# Patient Record
Sex: FEMALE | Race: WHITE
[De-identification: names, ages, dates, MRNs, and addresses within clinical notes are randomized per-mention and may not be internally consistent; named-entity substitution may affect disease eponyms.]

---

## 2017-06-02 ENCOUNTER — HOSPITAL ENCOUNTER (OUTPATIENT)
Dept: HOSPITAL 76 - LAB.N | Age: 42
Discharge: HOME | End: 2017-06-02
Attending: FAMILY MEDICINE
Payer: MEDICAID

## 2017-06-02 DIAGNOSIS — G89.4: ICD-10-CM

## 2017-06-02 DIAGNOSIS — Z79.899: ICD-10-CM

## 2017-06-02 DIAGNOSIS — F41.1: Primary | ICD-10-CM

## 2017-06-02 LAB
ALBUMIN/GLOB SERPL: 1.4 {RATIO} (ref 1–2.2)
ANION GAP SERPL CALCULATED.4IONS-SCNC: 7 MMOL/L (ref 6–13)
BASOPHILS NFR BLD AUTO: 0.1 10^3/UL (ref 0–0.1)
BASOPHILS NFR BLD AUTO: 0.7 %
BILIRUB BLD-MCNC: 0.4 MG/DL (ref 0.2–1)
BUN SERPL-MCNC: 14 MG/DL (ref 6–20)
CALCIUM UR-MCNC: 9.1 MG/DL (ref 8.5–10.3)
CHLORIDE SERPL-SCNC: 106 MMOL/L (ref 101–111)
CHOLEST SERPL-MCNC: 199 MG/DL
CO2 SERPL-SCNC: 26 MMOL/L (ref 21–32)
CREAT SERPLBLD-SCNC: 0.8 MG/DL (ref 0.4–1)
EOSINOPHIL # BLD AUTO: 0.4 10^3/UL (ref 0–0.7)
EOSINOPHIL NFR BLD AUTO: 5.1 %
ERYTHROCYTE [DISTWIDTH] IN BLOOD BY AUTOMATED COUNT: 14.2 % (ref 12–15)
GFRSERPLBLD MDRD-ARVRAT: 78 ML/MIN/{1.73_M2} (ref 89–?)
GLOBULIN SER-MCNC: 3 G/DL (ref 2.1–4.2)
GLUCOSE SERPL-MCNC: 106 MG/DL (ref 70–100)
HCT VFR BLD AUTO: 38.8 % (ref 37–47)
HDLC SERPL-MCNC: 56 MG/DL
HDLC SERPL: 3.6 {RATIO} (ref ?–4.4)
HGB UR QL STRIP: 12.9 G/DL (ref 12–16)
LDLC/HDLC SERPL: 2.2 {RATIO} (ref ?–4.4)
LYMPHOCYTES # SPEC AUTO: 2 10^3/UL (ref 1.5–3.5)
LYMPHOCYTES NFR BLD AUTO: 24.2 %
MCH RBC QN AUTO: 28.4 PG (ref 27–31)
MCHC RBC AUTO-ENTMCNC: 33.3 G/DL (ref 32–36)
MCV RBC AUTO: 85.3 FL (ref 81–99)
MONOCYTES # BLD AUTO: 0.5 10^3/UL (ref 0–1)
MONOCYTES NFR BLD AUTO: 6.3 %
NEUTROPHILS # BLD AUTO: 5.3 10^3/UL (ref 1.5–6.6)
NEUTROPHILS # SNV AUTO: 8.3 X10^3/UL (ref 4.8–10.8)
NEUTROPHILS NFR BLD AUTO: 63.7 %
NRBC # BLD AUTO: 0 /100WBC
PDW BLD AUTO: 9 FL (ref 7.9–10.8)
POTASSIUM SERPL-SCNC: 3.9 MMOL/L (ref 3.5–5)
PROT SPEC-MCNC: 7.1 G/DL (ref 6.7–8.2)
RBC MAR: 4.55 10^6/UL (ref 4.2–5.4)
SODIUM SERPLBLD-SCNC: 139 MMOL/L (ref 135–145)
TRIGL P FAST SERPL-MCNC: 105 MG/DL
VLDLC SERPL-SCNC: 21 MG/DL
WBC # BLD: 8.3 X10^3/UL

## 2017-06-02 PROCEDURE — 80061 LIPID PANEL: CPT

## 2017-06-02 PROCEDURE — 85025 COMPLETE CBC W/AUTO DIFF WBC: CPT

## 2017-06-02 PROCEDURE — 36415 COLL VENOUS BLD VENIPUNCTURE: CPT

## 2017-06-02 PROCEDURE — 84443 ASSAY THYROID STIM HORMONE: CPT

## 2017-06-02 PROCEDURE — 80053 COMPREHEN METABOLIC PANEL: CPT

## 2017-07-11 ENCOUNTER — HOSPITAL ENCOUNTER (EMERGENCY)
Dept: HOSPITAL 76 - ED | Age: 42
Discharge: HOME | End: 2017-07-11
Payer: MEDICAID

## 2017-07-11 VITALS — SYSTOLIC BLOOD PRESSURE: 145 MMHG | DIASTOLIC BLOOD PRESSURE: 88 MMHG

## 2017-07-11 DIAGNOSIS — J45.909: ICD-10-CM

## 2017-07-11 DIAGNOSIS — M16.11: ICD-10-CM

## 2017-07-11 DIAGNOSIS — R03.0: ICD-10-CM

## 2017-07-11 DIAGNOSIS — F41.9: Primary | ICD-10-CM

## 2017-07-11 LAB
CUL URINE ADD CHARGE: (no result)
HCG UR QL: NEGATIVE
PH UR STRIP.AUTO: 6 PH (ref 5–7.5)
SP GR UR STRIP.AUTO: 1.01 (ref 1–1.03)
UA CHARGE (STRIP ONLY): (no result)
UA W/ MICROSCOPIC CHARGE: YES
UR CULTURE IF IND: (no result)
UROBILINOGEN UR STRIP.AUTO-MCNC: NEGATIVE MG/DL
WBC # UR MANUAL: (no result) /HPF (ref 0–5)

## 2017-07-11 PROCEDURE — 99283 EMERGENCY DEPT VISIT LOW MDM: CPT

## 2017-07-11 PROCEDURE — 80306 DRUG TEST PRSMV INSTRMNT: CPT

## 2017-07-11 PROCEDURE — 81025 URINE PREGNANCY TEST: CPT

## 2017-07-11 PROCEDURE — 81001 URINALYSIS AUTO W/SCOPE: CPT

## 2017-07-11 PROCEDURE — 87086 URINE CULTURE/COLONY COUNT: CPT

## 2017-07-11 PROCEDURE — 81003 URINALYSIS AUTO W/O SCOPE: CPT

## 2017-07-11 NOTE — ED PHYSICIAN DOCUMENTATION
History of Present Illness





- Stated complaint


Stated Complaint: RT HIP PX/ANXIETY





- Chief complaint


Chief Complaint: Ext Problem





- History obtained from


History obtained from: Patient





- History of Present Illness


Timing: How many weeks ago (3)


Pain level max: 9


Pain level now: 9


Improved by: rest


Worsened by: movement





- Additonal information


Additional information: 





States R hip pain chronically, worse for past 3 weeks.  States worse with 

movement. Takes tizanidine and oxycodone daily. Is on 40mg of oxycodone daily.  

No recent injury. No falls. Patient is on a pain management contract.  States 

that steroids have helped in the past





Review of Systems


Constitutional: denies: Fever, Chills


Respiratory: denies: Cough


GI: denies: Abdominal Pain, Nausea, Vomiting


: denies: Now pregnant EGA


Skin: denies: Rash


Musculoskeletal: denies: Neck pain, Back pain


Neurologic: denies: Headache





PD PAST MEDICAL HISTORY





- Past Medical History


Past Medical History: Yes


Cardiovascular: None


Respiratory: Asthma


Neuro: Headache/migraine


Endocrine/Autoimmune: None


Psych: Anxiety


Musculoskeletal: Osteoarthritis





- Past Surgical History


Past Surgical History: Yes


/GYN:  section





- Present Medications


Home Medications: 


 Ambulatory Orders











 Medication  Instructions  Recorded  Confirmed


 


Albuterol Sulfate [Albuterol 2 puffs INH Q6HR 14





Sulfate Hfa]   


 


Fluticasone/Salmeterol [Advair 1 puffs INH DAILY 14





500-50 Diskus]   


 


Hydroxyzine HCl 25 mg PO DAILY 14


 


oxyCODONE [Roxicodone] 5 mg PO DAILY 14


 


Fluticasone [Flonase] 1 sprays BRENNAN BID #1 bottle 05/03/15 07/11/17


 


Butalb/Acetaminophen/Caffeine 1 tab PO PRN PRN 17





[Czcsox-Gswmuiew-Zxot -40]   


 


LORazepam [Ativan] 0.5 mg PO Q8H PRN #7 tablet 17 


 


Prednisone 40 mg PO DAILY #10 tablet 17 














- Allergies


Allergies/Adverse Reactions: 


 Allergies











Allergy/AdvReac Type Severity Reaction Status Date / Time


 


baclofen Allergy  Respiratory Verified 17 20:43


 


NSAIDS (Non-Steroidal Allergy  Edema Verified 17 20:43





Anti-Inflamma     


 


ipratropium AdvReac  Unknown Verified 17 20:24














- Social History


Does the pt smoke?: No


Smoking Status: Never smoker


Does the pt drink ETOH?: No


Does the pt have substance abuse?: No





- Immunizations


Immunizations are current?: No





- POLST


Patient has POLST: No





PD ED PE NORMAL





- Vitals


Vital signs reviewed: Yes





- General


General: Alert and oriented X 3, No acute distress





- Neck


Neck: Supple, no meningeal sign





- Respiratory


Respiratory: No respiratory distress, Clear bilaterally





- Abdomen


Abdomen: Soft, Non tender, Non distended





- Back


Back: No spinal TTP





- Derm


Derm: Warm and dry





- Extremities


Extremities: Other (Mild tenderness about the right hip.  There is full range 

of motion present, but with some pain.  No overlying skin changes.  No evidence 

of septic joint.  Neurovascularly intact)





- Neuro


Neuro: Alert and oriented X 3





- Psych


Psych: Normal mood, Normal affect





Results





- Vitals


Vitals: 


 Vital Signs - 24 hr











  17





  20:21 21:35


 


Temperature 37.2 C 


 


Heart Rate 102 H 90


 


Respiratory 14 16





Rate  


 


Blood Pressure 168/98 H 145/88 H


 


O2 Saturation 100 99








 Oxygen











O2 Source                      Room air

















- Labs


Labs: 


 Laboratory Tests











  17





  20:10 20:10


 


Urine Color   YELLOW


 


Urine Clarity   HAZY


 


Urine pH   6.0


 


Ur Specific Gravity   1.010


 


Urine Protein   NEGATIVE


 


Urine Glucose (UA)   NEGATIVE


 


Urine Ketones   NEGATIVE


 


Urine Occult Blood   SMALL H


 


Urine Nitrite   NEGATIVE


 


Urine Bilirubin   NEGATIVE


 


Urine Urobilinogen   0.2 (NORMAL)


 


Ur Leukocyte Esterase   NEGATIVE


 


Urine RBC   6-10 H


 


Urine WBC   6-10 H


 


Ur Squamous Epith Cells   NONE SEEN


 


Urine Bacteria   Rare


 


Ur Microscopic Review   INDICATED


 


Urine Culture Comments   INDICATED


 


Urine HCG, Qual   NEGATIVE


 


Urine Opiates Screen  NEGATIVE 


 


Ur Oxycodone Screen  POSITIVE H 


 


Urine Methadone Screen  NEGATIVE 


 


Ur Propoxyphene Screen  NEGATIVE 


 


Ur Barbiturates Screen  POSITIVE H 


 


Ur Tricyclics Screen  NEGATIVE 


 


Ur Phencyclidine Scrn  NEGATIVE 


 


Ur Amphetamine Screen  NEGATIVE 


 


U Methamphetamines Scrn  NEGATIVE 


 


U Benzodiazepines Scrn  NEGATIVE 


 


Urine Cocaine Screen  NEGATIVE 


 


U Cannabinoids Screen  NEGATIVE 














PD MEDICAL DECISION MAKING





- ED course


Complexity details: reviewed results, re-evaluated patient, considered 

differential, d/w patient


ED course: 





Patient is a 41-year-old female who presents to the emergency department with 

worsening right hip pain.  She does use a cane to ambulate at home, but is not 

currently using this in the emergency department.  Steroids have helped in the 

past and so was given a dose of dexamethasone will prescribe prednisone for 

home.  She also states she has increasing anxiety and is out of her anxiety 

medication.  Will prescribe a small amount of benzodiazepines for her.  She is 

on the pain management contract and does not want any narcotics at this time.  

Patient is also driving home.  She was given crutches to help take the weight 

off of the leg, but is using them more as assistive walking devices.  Patient 

is well-appearing, nontoxic.  Afebrile.  Patient counseled regarding signs and 

symptoms for which I believe and urgent re-evaluation would be necessary. 

Patient with good understanding of and agreement to plan and is comfortable 

going home at this time





This document was made in part using voice recognition software. While efforts 

are made to proofread this document, sound alike and grammatical errors may 

occur.





Departure





- Departure


Disposition:  Home, Self Care


Clinical Impression: 


 Anxiety





Osteoarthritis


Qualifiers:


 Osteoarthritis location: hip Osteoarthritis type: unspecified Laterality: 

right Qualified Code(s): M16.11 - Unilateral primary osteoarthritis, right hip


Condition: Good


Instructions:  ED Degenerative Joint Disease


Follow-Up: 


Alfonso Rodriges MD [Primary Care Provider] - Within 1 week


Prescriptions: 


LORazepam [Ativan] 0.5 mg PO Q8H PRN #7 tablet


 PRN Reason: Anxiety


Prednisone 40 mg PO DAILY #10 tablet


Comments: 


Return if you worsen. do not drive or operate heavy machinery while taking 

ativan. 





Your blood pressure was elevated today on check in to the emergency department. 

This does not mean that you have hypertension, it is a common phenomenon to 

check into the emergency department and have elevated blood pressure. I 

recommend that you see your primary care physician within the week to have it 

rechecked when you're feeling better.





Discharge Date/Time: 17 21:41

## 2017-12-01 ENCOUNTER — HOSPITAL ENCOUNTER (EMERGENCY)
Dept: HOSPITAL 76 - ED | Age: 42
Discharge: HOME | End: 2017-12-01
Payer: MEDICAID

## 2017-12-01 VITALS — SYSTOLIC BLOOD PRESSURE: 150 MMHG | DIASTOLIC BLOOD PRESSURE: 99 MMHG

## 2017-12-01 DIAGNOSIS — H66.002: ICD-10-CM

## 2017-12-01 DIAGNOSIS — J40: Primary | ICD-10-CM

## 2017-12-01 PROCEDURE — 71020: CPT

## 2017-12-01 PROCEDURE — 99283 EMERGENCY DEPT VISIT LOW MDM: CPT

## 2017-12-01 PROCEDURE — 94640 AIRWAY INHALATION TREATMENT: CPT

## 2017-12-01 NOTE — ED PHYSICIAN DOCUMENTATION
PD HPI URI





- Stated complaint


Stated Complaint: CONGESTION/ST





- Chief complaint


Chief Complaint: Resp





- History obtained from


History obtained from: Patient





- History of Present Illness


Timing - onset: How many days ago (10)


Timing duration: Days (10)


Timing details: Gradual onset, Still present (was improving some after a week, 

now worse the past few days.)


Associated symptoms: Fever, Sinus pain, Sore throat, Productive cough


Contributing factors: COPD / asthma.  No: Sick contact, Travel, 

Immunocompromised


Worsened by: Activity, Breathing, Other (cough)


Similar symptoms before: Has not had sx before


Recently seen: Not recently seen





Review of Systems


Constitutional: reports: Fever, Chills, Myalgias


Nose: reports: Rhinorrhea / runny nose, Sinus pressure / pain


Cardiac: denies: Chest pain / pressure


Respiratory: reports: Dyspnea, Cough


GI: reports: Vomiting.  denies: Nausea, Diarrhea


: denies: Dysuria





PD PAST MEDICAL HISTORY





- Past Medical History


Past Medical History: Yes


Cardiovascular: None


Respiratory: Asthma


Neuro: Headache/migraine


Endocrine/Autoimmune: None


GI: None


GYN: None


: None


HEENT: None


Psych: Anxiety


Musculoskeletal: Osteoarthritis


Derm: None





- Past Surgical History


Past Surgical History: Yes


/GYN:  section





- Present Medications


Home Medications: 


 Ambulatory Orders











 Medication  Instructions  Recorded  Confirmed


 


Albuterol Sulfate [Albuterol 2 puffs INH Q6HR 14





Sulfate Hfa]   


 


Fluticasone/Salmeterol [Advair 1 puffs INH DAILY 14





500-50 Diskus]   


 


Hydroxyzine HCl 25 mg PO DAILY 14


 


oxyCODONE [Roxicodone] 5 mg PO DAILY 14


 


Fluticasone [Flonase] 1 sprays BRENNAN BID #1 bottle 05/03/15 07/11/17


 


Butalb/Acetaminophen/Caffeine 1 tab PO PRN PRN 17





[Agjadb-Exdhztab-Qbuc -40]   


 


LORazepam [Ativan] 0.5 mg PO Q8H PRN #7 tablet 17 


 


predniSONE [Prednisone] 40 mg PO DAILY #10 tablet 17 


 


Amoxicillin 500 mg PO Q8H #30 capsule 17 


 


Oxymetazoline HCl [Afrin] 2 spray NS BID PRN #1 bottle 17 


 


predniSONE [Deltasone] 60 mg PO DAILY 5 Days  tablet 17 


 


Albuterol Sulf [Ventolin Hfa 1 - 2 puffs INH Q4HR PRN #1 inhaler 17 





Inhaler]   


 


Azithromycin [Zithromax] 250 mg PO DAILY #4 tablet 17 


 


Benzonatate [Tessalon] 100 mg PO TID PRN #25 capsule 17 


 


Dexamethasone [Decadron] 4 mg PO DAILY #5 tablet 17 


 


guaiFENesin/CODEINE [Robitussin AC] 10 ml PO Q6H PRN #240 ml 17 














- Allergies


Allergies/Adverse Reactions: 


 Allergies











Allergy/AdvReac Type Severity Reaction Status Date / Time


 


baclofen Allergy  Respiratory Verified 17 19:10


 


NSAIDS (Non-Steroidal Allergy  Edema Verified 17 19:10





Anti-Inflamma     


 


ipratropium AdvReac  Unknown Verified 17 19:10














- Social History


Does the pt smoke?: No


Smoking Status: Never smoker


Does the pt drink ETOH?: No


Does the pt have substance abuse?: No





- Immunizations


Immunizations are current?: No





- POLST


Patient has POLST: No





PD ED PE NORMAL





- Vitals


Vital signs reviewed: Yes





- General


General: Alert and oriented X 3, Well developed/nourished





- HEENT


HEENT: Ears normal, Pharynx benign





- Neck


Neck: Supple, no meningeal sign, No adenopathy





- Cardiac


Cardiac: RRR, No murmur





- Respiratory


Respiratory: Clear bilaterally





- Abdomen


Abdomen: Soft, Non tender





- Back


Back: No CVA TTP





- Derm


Derm: Normal color, Warm and dry





- Extremities


Extremities: No deformity, No tenderness to palpate, No edema, No calf 

tenderness / cord





- Neuro


Neuro: Alert and oriented X 3, No motor deficit, Normal speech





Results





- Vitals


Vitals: 


 Oxygen











O2 Source                      Room air

















- Rads (name of study)


  ** chest xray


Radiology: Prelim report reviewed (no acute process)





PD MEDICAL DECISION MAKING





- ED course


Complexity details: considered differential, d/w patient





Departure





- Departure


Disposition:  Home, Self Care


Clinical Impression: 


 Bronchitis





Otitis media


Qualifiers:


 Otitis media type: suppurative Chronicity: acute Laterality: left Recurrence: 

not specified as recurrent Spontaneous tympanic membrane rupture: without 

spontaneous rupture Qualified Code(s): H66.002 - Acute suppurative otitis media 

without spontaneous rupture of ear drum, left ear





Clinical Impression: 


 (Ruled Out): Pneumonia


Condition: Stable


Record reviewed to determine appropriate education?: Yes


Instructions:  ED Upper Resp Infec Abx Tx, ED Otitis Media Acute Adult


Follow-Up: 


Angelica Goncalves ARNP [Primary Care Provider] - 


Prescriptions: 


Albuterol Sulf [Ventolin Hfa Inhaler] 1 - 2 puffs INH Q4HR PRN #1 inhaler


 PRN Reason: Shortness Of Air/Wheezing


Azithromycin [Zithromax] 250 mg PO DAILY #4 tablet


Benzonatate [Tessalon] 100 mg PO TID PRN #25 capsule


 PRN Reason: Cough


Dexamethasone [Decadron] 4 mg PO DAILY #5 tablet


guaiFENesin/CODEINE [Robitussin AC] 10 ml PO Q6H PRN #240 ml


 PRN Reason: Cough


Comments: 


Drink lots of fluids.  Continue albuterol inhaler 2 puffs 4 times a day and 

extra doses as needed.  Use Decadron steroid daily for 5 more days.  Finish the 

Zithromax course of 5 days treatment.  Use Tessalon if needed for cough.  Add 

hydrocodone or codeine as a cough suppressant and to help with pain.  The 

Zithromax should help with bronchial and ear infections.  Recheck if not 

improving over the next few days.


Discharge Date/Time: 17 22:17

## 2017-12-01 NOTE — XRAY REPORT
EXAM:

CHEST RADIOGRAPHY

 

EXAM DATE: 12/1/2017 07:42 PM.

 

CLINICAL HISTORY: Cough.

 

COMPARISON: 11/06/2017, 05/23/2011, and 10/26/2010.

 

TECHNIQUE: 2 views.

 

FINDINGS: 

Lungs/Pleura: No localized infiltrate, consolidation, effusion, or pneumothorax.

 

Mediastinum: Heart and mediastinal contours are unremarkable.

 

Other: Mild degenerative changes.

 

IMPRESSION: No acute disease.

 

[facility]

Referring Provider Line: [phone]

 

SITE ID: [id number]

## 2017-12-01 NOTE — XRAY PRELIMINARY REPORT
Accession: [id number]

Exam: XR CHEST 2 VIEW PA/LAT

 

IMPRESSION: No acute disease.

 

[facility]

 

SITE ID: [id number]

## 2018-08-06 ENCOUNTER — HOSPITAL ENCOUNTER (EMERGENCY)
Dept: HOSPITAL 76 - ED | Age: 43
LOS: 1 days | Discharge: HOME | End: 2018-08-07
Payer: MEDICAID

## 2018-08-06 DIAGNOSIS — S83.92XA: Primary | ICD-10-CM
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
## 2018-08-06 PROCEDURE — 73562 X-RAY EXAM OF KNEE 3: CPT

## 2018-08-06 PROCEDURE — 93971 EXTREMITY STUDY: CPT

## 2018-08-06 PROCEDURE — 99283 EMERGENCY DEPT VISIT LOW MDM: CPT

## 2018-08-06 NOTE — ED PHYSICIAN DOCUMENTATION
History of Present Illness





- Stated complaint


Stated Complaint: L KNEE/LEG PX





- Chief complaint


Chief Complaint: Ext Problem





- History obtained from


History obtained from: Patient





- History of Present Illness


Timing: How many weeks ago (1)


Pain level max: 8


Pain level now: 6


Improved by: rest


Worsened by: movement, weight-bearing





- Additonal information


Additional information: 





c/o 1 week of left knee pain and swelling, sudden onset when she sustained a 

twisting injury to the knee





Review of Systems


Constitutional: denies: Fever


Cardiac: denies: Chest pain / pressure


Respiratory: denies: Dyspnea


Skin: denies: Rash


Musculoskeletal: reports: Joint pain, Extremity swelling, Joint swelling, Pain 

with weight bearing


Neurologic: denies: Focal weakness, Numbness





PD PAST MEDICAL HISTORY





- Past Medical History


Cardiovascular: None


Respiratory: Asthma


Endocrine/Autoimmune: None


GI: None


GYN: None


: None


HEENT: None


Psych: Anxiety


Musculoskeletal: Osteoarthritis


Derm: None





- Past Surgical History


Past Surgical History: Yes


/GYN:  section





- Present Medications


Home Medications: 


 Ambulatory Orders











 Medication  Instructions  Recorded  Confirmed


 


Albuterol Sulfate [Albuterol 2 puffs INH Q6HR 14





Sulfate Hfa]   


 


Fluticasone/Salmeterol [Advair 1 puffs INH DAILY 14





500-50 Diskus]   


 


Hydroxyzine HCl 25 mg PO DAILY 14


 


oxyCODONE [Roxicodone] 5 mg PO DAILY 14


 


Fluticasone [Flonase] 1 sprays BRENNAN BID #1 bottle 05/03/15 07/11/17


 


Butalb/Acetaminophen/Caffeine 1 tab PO PRN PRN 17





[Ofntqt-Upikczab-Lkih -40]   


 


LORazepam [Ativan] 0.5 mg PO Q8H PRN #7 tablet 17 


 


predniSONE [Prednisone] 40 mg PO DAILY #10 tablet 17 


 


Amoxicillin 500 mg PO Q8H #30 capsule 17 


 


Oxymetazoline HCl [Afrin] 2 spray NS BID PRN #1 bottle 17 


 


predniSONE [Deltasone] 60 mg PO DAILY 5 Days  tablet 17 


 


Albuterol Sulf [Ventolin Hfa 1 - 2 puffs INH Q4HR PRN #1 inhaler 17 





Inhaler]   


 


Azithromycin [Zithromax] 250 mg PO DAILY #4 tablet 17 


 


Benzonatate [Tessalon] 100 mg PO TID PRN #25 capsule 17 


 


Dexamethasone [Decadron] 4 mg PO DAILY #5 tablet 17 


 


guaiFENesin/CODEINE [Robitussin AC] 10 ml PO Q6H PRN #240 ml 17 


 


Cyclobenzaprine [Flexeril] 10 mg PO TID PRN #20 tablet 18 


 


HYDROcod/ACETAM 5/325 [Norco 5/325] 1 - 2 ea PO Q6H PRN #15 tablet 18 


 


predniSONE [Prednisone] 40 mg PO DAILY #6 tablet 18 














- Allergies


Allergies/Adverse Reactions: 


 Allergies











Allergy/AdvReac Type Severity Reaction Status Date / Time


 


baclofen Allergy  Respiratory Verified 18 22:34


 


NSAIDS (Non-Steroidal Allergy  Edema Verified 18 22:34





Anti-Inflamma     


 


ipratropium AdvReac  Unknown Verified 18 22:34














- Social History


Does the pt smoke?: No


Smoking Status: Never smoker


Does the pt drink ETOH?: No


Does the pt have substance abuse?: No





- Immunizations


Immunizations are current?: No





- POLST


Patient has POLST: No





PD ED PE NORMAL





- Vitals


Vital signs reviewed: Yes





- General


General: Alert and oriented X 3, No acute distress, Well developed/nourished





- Cardiac


Cardiac: RRR, No murmur





- Respiratory


Respiratory: No respiratory distress, Clear bilaterally





- Derm


Derm: Normal color, Warm and dry, No rash





Results





- Vitals


Vitals: 


 Vital Signs - 24 hr











  18





  22:31 23:15 23:33


 


Temperature 36.8 C  


 


Heart Rate 79  


 


Respiratory 17 17 16





Rate   


 


Blood Pressure 145/95 H  


 


O2 Saturation 96  














  18





  00:56 02:10


 


Temperature 36.5 C 


 


Heart Rate 83 


 


Respiratory 12 17





Rate  


 


Blood Pressure 149/92 H 


 


O2 Saturation 96 








 Oxygen











O2 Source                      Room air

















- Rads (name of study)


  ** LLE US


Radiology: Prelim report reviewed, See rad report





  ** left knee xrays


Radiology: Prelim report reviewed, See rad report





PD MEDICAL DECISION MAKING





- ED course


Complexity details: reviewed results, re-evaluated patient, considered 

differential, d/w patient





- Sepsis Event


Vital Signs: 


 Vital Signs - 24 hr











  18





  22:31 23:15 23:33


 


Temperature 36.8 C  


 


Heart Rate 79  


 


Respiratory 17 17 16





Rate   


 


Blood Pressure 145/95 H  


 


O2 Saturation 96  














  18





  00:56 02:10


 


Temperature 36.5 C 


 


Heart Rate 83 


 


Respiratory 12 17





Rate  


 


Blood Pressure 149/92 H 


 


O2 Saturation 96 








 Oxygen











O2 Source                      Room air

















Departure





- Departure


Disposition:  Home, Self Care


Clinical Impression: 


Left knee sprain


Qualifiers:


 Encounter type: initial encounter Involved ligament of knee: unspecified 

ligament Qualified Code(s): S83.92XA - Sprain of unspecified site of left knee, 

initial encounter





Condition: Good


Instructions:  ED Bandage Elastic Wrap, ED Knee Pain UKO, ED Sprain Knee


Follow-Up: 


WYATT PAEZ [Primary Care Provider] - Within 1 week


Prescriptions: 


Cyclobenzaprine [Flexeril] 10 mg PO TID PRN #20 tablet


 PRN Reason: Spasms


HYDROcod/ACETAM 5/325 [Norco 5/325] 1 - 2 ea PO Q6H PRN #15 tablet


 PRN Reason: Pain


predniSONE [Prednisone] 40 mg PO DAILY #6 tablet


Discharge Date/Time: 18 02:12

## 2018-08-06 NOTE — XRAY REPORT
Procedure Date:  08/06/2018   

Accession Number:  [id number] / [id number]                    

Procedure:  XR  - Knee 3 View LT CPT Code:  

 

FULL RESULT:

 

 

EXAM:

LEFT KNEE RADIOGRAPHY

 

EXAM DATE: 8/6/2018 11:15 PM.

 

CLINICAL HISTORY: Left knee pain.

 

COMPARISON: None.

 

TECHNIQUE: 3 views.

 

FINDINGS:

Bones: Normal. No fractures or bone lesions.

 

Joints: Normal. No effusion. No subluxations.

 

Soft Tissues: Normal. No soft tissue swelling.

IMPRESSION: Normal knee radiography.

 

[facility]

## 2018-08-07 VITALS — SYSTOLIC BLOOD PRESSURE: 149 MMHG | DIASTOLIC BLOOD PRESSURE: 92 MMHG

## 2018-08-07 NOTE — ULTRASOUND REPORT
Procedure Date:  08/07/2018   

Accession Number:  [id number] / [id number]                    

Procedure:  US  - Duplex Ext Veins Left CPT Code:  

 

FULL RESULT:

 

 

EXAM:

LEFT LOWER EXTREMITY VENOUS ULTRASOUND

 

EXAM DATE: 8/7/2018 12:16 AM.

 

CLINICAL HISTORY: Pain, swelling.

 

COMPARISON: None.

 

TECHNIQUE: Real-time sonographic vascular imaging was performed by the 

sonographer through the lower extremity utilizing both color-flow and 

Doppler spectral analysis. Multiple representative static images were 

saved for review.

 

FINDINGS:

Common Femoral Vein (CFV): Normal.

CFV-GSV Junction: Normal.

Profunda Femoral Vein (PFV): Normal.

Femoral Vein (FV) Prox: Normal.

Femoral Vein (FV) Mid: Normal.

Femoral Vein (FV) Dist: Normal.

Popliteal Vein: Normal.

Posterior Tibial Veins: Suboptimally visualized.

Peroneal Veins: Suboptimally visualized.

 

 

Other: The examination is limited, as he patient was unable to tolerate 

compression.

IMPRESSION: Examination limited by patient's inability to tolerate 

compression, but no definite evidence for deep venous thrombosis in the 

visualized segments.

 

[facility]

## 2018-11-07 ENCOUNTER — HOSPITAL ENCOUNTER (EMERGENCY)
Dept: HOSPITAL 76 - ED | Age: 43
Discharge: HOME | End: 2018-11-07
Payer: MEDICAID

## 2018-11-07 VITALS — DIASTOLIC BLOOD PRESSURE: 92 MMHG | SYSTOLIC BLOOD PRESSURE: 155 MMHG

## 2018-11-07 DIAGNOSIS — Z79.51: ICD-10-CM

## 2018-11-07 DIAGNOSIS — Z79.899: ICD-10-CM

## 2018-11-07 DIAGNOSIS — J06.9: Primary | ICD-10-CM

## 2018-11-07 DIAGNOSIS — J45.901: ICD-10-CM

## 2018-11-07 PROCEDURE — 99283 EMERGENCY DEPT VISIT LOW MDM: CPT

## 2018-11-07 NOTE — ED PHYSICIAN DOCUMENTATION
History of Present Illness





- Stated complaint


Stated Complaint: ASTHMA ATTACK





- Chief complaint


Chief Complaint: Resp





- Additonal information


Additional information: 


43-year-old female presents the emergency department with complaints of wheezing

and shortness of breath which is not improved with her normal albuterol 

treatments.  The patient also reports URI symptoms which is triggered her 

asthma.  Symptoms are described as moderate.  No other associated symptoms. 

Symptoms were worse this evening just prior to arrival.








Review of Systems


Constitutional: denies: Fever, Chills, Fatigue


Eyes: denies: Discharge


Ears: denies: Ear pain


Nose: reports: Rhinorrhea / runny nose, Congestion


Throat: reports: Sore throat


Cardiac: denies: Chest pain / pressure


Respiratory: reports: Cough, Wheezing


GI: denies: Abdominal Pain


: denies: Dysuria


Skin: denies: Rash


Musculoskeletal: denies: Neck pain


Immunocompromised: denies: Chemotherapy





PD PAST MEDICAL HISTORY





- Past Medical History


Cardiovascular: None


Respiratory: Asthma


Neuro: None


Endocrine/Autoimmune: None


GI: None


GYN: None


: None


HEENT: None


Psych: Anxiety


Musculoskeletal: Osteoarthritis


Derm: None





- Past Surgical History


Past Surgical History: Yes


/GYN:  section





- Present Medications


Home Medications: 


                                Ambulatory Orders











 Medication  Instructions  Recorded  Confirmed


 


Albuterol Sulfate [Albuterol 2 puffs INH Q6HR 14





Sulfate Hfa]   


 


Fluticasone/Salmeterol [Advair 1 puffs INH DAILY 14





500-50 Diskus]   


 


Hydroxyzine HCl 25 mg PO DAILY 14


 


oxyCODONE [Roxicodone] 5 mg PO DAILY 14


 


Fluticasone [Flonase] 1 sprays BRENNAN BID #1 bottle 05/03/15 07/11/17


 


Butalb/Acetaminophen/Caffeine 1 tab PO PRN PRN 17





[[id number]-[id number]-Tcrf -40]   


 


LORazepam [Ativan] 0.5 mg PO Q8H PRN #7 tablet 17 


 


predniSONE [Prednisone] 40 mg PO DAILY #10 tablet 17 


 


Amoxicillin 500 mg PO Q8H #30 capsule 17 


 


Oxymetazoline HCl [Afrin] 2 spray NS BID PRN #1 bottle 17 


 


predniSONE [Deltasone] 60 mg PO DAILY 5 Days  tablet 17 


 


Albuterol Sulf [Ventolin Hfa 1 - 2 puffs INH Q4HR PRN #1 inhaler 17 





Inhaler]   


 


Azithromycin [Zithromax] 250 mg PO DAILY #4 tablet 17 


 


Benzonatate [Tessalon] 100 mg PO TID PRN #25 capsule 17 


 


Dexamethasone [Decadron] 4 mg PO DAILY #5 tablet 17 


 


guaiFENesin/CODEINE [Robitussin AC] 10 ml PO Q6H PRN #240 ml 17 


 


Cyclobenzaprine [Flexeril] 10 mg PO TID PRN #20 tablet 18 


 


HYDROcod/ACETAM 5/325 [Norco 5/325] 1 - 2 ea PO Q6H PRN #15 tablet 18 


 


predniSONE [Prednisone] 40 mg PO DAILY #6 tablet 18 


 


Benzonatate [Tessalon Perle] 100 - 200 mg PO TID PRN #30 capsule 18 














- Allergies


Allergies/Adverse Reactions: 


                                    Allergies











Allergy/AdvReac Type Severity Reaction Status Date / Time


 


baclofen Allergy  Respiratory Verified 18 22:19


 


NSAIDS (Non-Steroidal Allergy  Edema Verified 18 22:19





Anti-Inflamma     


 


ipratropium AdvReac  Unknown Verified 18 22:19














- Social History


Does the pt smoke?: No


Smoking Status: Never smoker


Does the pt drink ETOH?: No


Does the pt have substance abuse?: No





- Immunizations


Immunizations are current?: No





- POLST


Patient has POLST: No





PD ED PE NORMAL





- General


General: Alert and oriented X 3, No acute distress





- HEENT


HEENT: Atraumatic, PERRL, EOMI, Ears normal





- Neck


Neck: Supple, no meningeal sign





- Cardiac


Cardiac: RRR, Strong equal pulses





- Respiratory


Respiratory: No respiratory distress.  No: Clear bilaterally (Bilateral 

expiratory wheezing)





- Back


Back: No CVA TTP





- Derm


Derm: Normal color





- Neuro


Neuro: Alert and oriented X 3, Normal speech





- Psych


Psych: Normal mood





Results





- Vitals


Vitals: 


                               Vital Signs - 24 hr











  11/07/18 11/07/18 11/07/18





  22:13 22:44 23:37


 


Temperature 36.8 C  


 


Heart Rate 88  101 H


 


Respiratory 18 20 16





Rate   


 


Blood Pressure 168/84 H  155/92 H


 


O2 Saturation 97  100








                                     Oxygen











O2 Source                      Room air

















PD MEDICAL DECISION MAKING





- ED course


ED course: 





The patient was treated with albuterol in the emergency department and had good 

improvement.  I reevaluation the patient resting comfortably and her symptoms 

are under much better control.  Currently, the patient appears appropriate for 

discharge and ongoing outpatient management.  The patient's symptoms seem to be 

triggered from a viral upper respiratory tract infection.  I discussed with the 

patient warning signs and recommended returning to the emergency department 

immediately for any worsening or any concerns.





Departure





- Departure


Disposition: 01 Home, Self Care


Clinical Impression: 


 Viral URI with cough





Asthma exacerbation


Qualifiers:


 Asthma severity: moderate Asthma persistence: unspecified Qualified Code(s): 

J45.901 - Unspecified asthma with (acute) exacerbation





Condition: Good


Instructions:  ED Bronchitis Asthmatic Ch


Follow-Up: 


WYATT PAEZ [Primary Care Provider] - Within 1 week


Prescriptions: 


Benzonatate [Tessalon Perle] 100 - 200 mg PO TID PRN #30 capsule


 PRN Reason: Cough


Comments: 


Please return to the emergency department for worsening symptoms or any concerns


Discharge Date/Time: 18 23:44

## 2019-07-03 ENCOUNTER — HOSPITAL ENCOUNTER (EMERGENCY)
Dept: HOSPITAL 76 - ED | Age: 44
Discharge: HOME | End: 2019-07-03
Payer: MEDICAID

## 2019-07-03 VITALS — SYSTOLIC BLOOD PRESSURE: 148 MMHG | DIASTOLIC BLOOD PRESSURE: 99 MMHG
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
## 2019-07-03 DIAGNOSIS — S05.01XA: Primary | ICD-10-CM
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
## 2019-07-03 PROCEDURE — 99283 EMERGENCY DEPT VISIT LOW MDM: CPT

## 2019-07-03 NOTE — ED PHYSICIAN DOCUMENTATION
PD HPI OPHTHO





- Stated complaint


Stated Complaint: RT EYE INJ





- Chief complaint


Chief Complaint: Heent





- History obtained from


History obtained from: Patient





- History of Present Illness


Timing - onset: Last night


Timing - duration: Days (1)


Timing - details: Abrupt onset, Still present


Location: Right


Quality / character: Sharp


Associated symptoms: Redness, FB sensation (She states she was wrestling with 

her teenage daughter and accidentally got poked in the eye with the finger.  She

did irrigated out last night in case there was something in it.  She states it 

was uncomfortable for eye motion and some light sensitivity.  The discomfort 

continued into today.  There was some slight crusting along the medial canthus 

in the morning but no ongoing discharge.), Photophobia.  No: Swelling, 

Discharge, Loss of vision, Headache


Contributing factors: Blunt trauma.  No: Wears contacts


Similar symptoms before: Has not had sx before


Recently seen: Not recently seen





Review of Systems


Constitutional: denies: Fever, Chills, Myalgias


Eyes: reports: Photophobia, Irritation.  denies: Loss of vision, Decreased 

vision, Discharge


Nose: denies: Rhinorrhea / runny nose, Congestion, Sinus pressure / pain


Throat: denies: Sore throat





PD PAST MEDICAL HISTORY





- Past Medical History


Cardiovascular: None


Respiratory: Asthma


Neuro: None


Endocrine/Autoimmune: None


GI: None


GYN: None


: None


HEENT: None


Psych: Anxiety


Musculoskeletal: Osteoarthritis


Derm: None





- Past Surgical History


Past Surgical History: Yes


/GYN:  section





- Present Medications


Home Medications: 


                                Ambulatory Orders











 Medication  Instructions  Recorded  Confirmed


 


Albuterol Sulfate [Albuterol 2 puffs INH Q6HR 14





Sulfate Hfa]   


 


Fluticasone/Salmeterol [Advair 1 puffs INH DAILY 14





500-50 Diskus]   


 


RX: Hydroxyzine HCl 25 mg PO DAILY 14


 


RX: oxyCODONE [Roxicodone] 5 mg PO DAILY 14


 


Fluticasone [Flonase] 1 sprays BRENNAN BID #1 bottle 05/03/15 07/11/17


 


Butalb/Acetaminophen/Caffeine 1 tab PO PRN PRN 17





[Islwed-Dacvuwdg-Slig -40]   


 


LORazepam [Ativan] 0.5 mg PO Q8H PRN #7 tablet 17 


 


RX: predniSONE [Prednisone] 40 mg PO DAILY #10 tablet 17 


 


Oxymetazoline HCl [Afrin] 2 spray NS BID PRN #1 bottle 17 


 


RX: Amoxicillin 500 mg PO Q8H #30 capsule 17 


 


RX: predniSONE [Deltasone] 60 mg PO DAILY 5 Days  tablet 17 


 


Azithromycin [Zithromax] 250 mg PO DAILY #4 tablet 17 


 


Benzonatate [Tessalon] 100 mg PO TID PRN #25 capsule 17 


 


RX: Albuterol Sulf [Ventolin Hfa 1 - 2 puffs INH Q4HR PRN #1 inhaler 17 





Inhaler]   


 


dexAMETHasone [Decadron] 4 mg PO DAILY #5 tablet 17 


 


guaiFENesin/CODEINE [Robitussin AC] 10 ml PO Q6H PRN #240 ml 17 


 


Cyclobenzaprine [Flexeril] 10 mg PO TID PRN #20 tablet 18 


 


RX: HYDROcod/ACETAM 5/325 [Norco 1 - 2 ea PO Q6H PRN #15 tablet 18 





5/325]   


 


predniSONE [Prednisone] 40 mg PO DAILY #6 tablet 18 


 


Benzonatate [Tessalon Perle] 100 - 200 mg PO TID PRN #30 capsule 18 


 


RX: Ketotifen Fumarate 2 drops OP QID #1 bottle 19 


 


Sulfacetamide 10% Ophth Drops 1 drops OPTH Q3H #1 bottle 19 





[Sulfamide 10% Ophth Drops]   














- Allergies


Allergies/Adverse Reactions: 


                                    Allergies











Allergy/AdvReac Type Severity Reaction Status Date / Time


 


baclofen Allergy  Respiratory Verified 18 22:19


 


NSAIDS (Non-Steroidal Allergy  Edema Verified 18 22:19





Anti-Inflamma     


 


ipratropium AdvReac  Unknown Verified 18 22:19














- Social History


Does the pt smoke?: No


Smoking Status: Never smoker


Does the pt drink ETOH?: No


Does the pt have substance abuse?: No





- Immunizations


Immunizations are current?: No





- POLST


Patient has POLST: No





PD ED PE NORMAL





- Vitals


Vital signs reviewed: Yes





- General


General: Alert and oriented X 3, Well developed/nourished, Other (She is holding

her right eye closed with her hand.  She has discomfort with eye movement.)





- HEENT


HEENT: PERRL, EOMI





PD ED PE EXPANDED





- Eyes


Eyes: Right eye, Injected conj/sclera, Corneal abrasion (circular small 

superficial abrasion right eye over iris area, at the 3 o'clock position. No 

deep uptake. ), Fluorescein uptake.  No: Eyelid injury, Exudate, Conj/sclera FB,

Corneal FB





Results





- Vitals


Vitals: 


                               Vital Signs - 24 hr











  19





  12:04 12:58


 


Temperature 36.6 C 36.5 C


 


Heart Rate 102 H 77


 


Respiratory 16 17





Rate  


 


Blood Pressure 149/93 H 148/99 H


 


O2 Saturation 98 99








                                     Oxygen











O2 Source                      Room air

















PD MEDICAL DECISION MAKING





- ED course


Complexity details: re-evaluated patient (she feels quite improved with 

Proparacaine drops. This helped. Discussed use of it with the patient, in 

accordance with recent EM literature showing safety and efficacy provided one 

avoid repeat injury and does not use it more than 1-2 days withut rechecking. ),

considered differential, d/w patient





Departure





- Departure


Disposition: 01 Home, Self Care


Clinical Impression: 


 Corneal abrasion





Condition: Stable


Record reviewed to determine appropriate education?: Yes


Instructions:  ED Eye Injury Corneal Abrasion


Follow-Up: 


Alfonso Kraft [Primary Care Provider] - 


Prescriptions: 


RX: Ketotifen Fumarate 2 drops OP QID #1 bottle


Sulfacetamide 10% Ophth Drops [Sulfamide 10% Ophth Drops] 1 drops OPTH Q3H #1 

bottle


Comments: 


Use the antibiotic eyedrops and antihistamine eyedrops 4 times a day for the 

next few days.  The abrasions typically heal on their own over a day or 2.  

There is a little bit of redness and increased blood flow through the eye which 

is likely irritation but will cover it for possible infection as well.  Recheck 

if not improved over the next 1 to 2 days. Use the numbing eye drops as needed 

for discomfort with precautions we discussed. 


Discharge Date/Time: 19 13:10

## 2019-08-30 ENCOUNTER — HOSPITAL ENCOUNTER (EMERGENCY)
Dept: HOSPITAL 76 - ED | Age: 44
Discharge: HOME | End: 2019-08-30
Payer: MEDICAID

## 2019-08-30 VITALS — DIASTOLIC BLOOD PRESSURE: 105 MMHG | SYSTOLIC BLOOD PRESSURE: 141 MMHG

## 2019-08-30 DIAGNOSIS — G80.1: ICD-10-CM

## 2019-08-30 DIAGNOSIS — R03.0: ICD-10-CM

## 2019-08-30 DIAGNOSIS — M79.671: Primary | ICD-10-CM

## 2019-08-30 PROCEDURE — 99282 EMERGENCY DEPT VISIT SF MDM: CPT

## 2019-08-30 PROCEDURE — 99283 EMERGENCY DEPT VISIT LOW MDM: CPT

## 2019-08-30 NOTE — ED PHYSICIAN DOCUMENTATION
PD HPI LOWER EXT INJURY





- Stated complaint


Stated Complaint: RT FOOT PX





- Chief complaint


Chief Complaint: Ext Problem





- History obtained from


History obtained from: Patient





- History of Present Illness


PD HPI LOW EXT INJURY LOCATION: Right (43-year-old woman with spastic cerebral 

palsy.  Gets occasional episodes of overuse related lateral right foot pain with

spasms.  She has 1 of these now.  Its been going on for the last few days.  She 

feels like it came on because of its increased activity around the house.  In 

the past she is had excellent relief with steroids with this.)





Review of Systems


Constitutional: reports: Reviewed and negative


Cardiac: reports: Reviewed and negative


Respiratory: reports: Reviewed and negative





PD PAST MEDICAL HISTORY





- Past Medical History


Cardiovascular: None


Respiratory: Asthma


Neuro: None


Endocrine/Autoimmune: None


GI: None


GYN: None


: None


HEENT: None


Psych: Anxiety


Musculoskeletal: Osteoarthritis


Derm: None





- Past Surgical History


Past Surgical History: Yes


/GYN:  section





- Present Medications


Home Medications: 


                                Ambulatory Orders











 Medication  Instructions  Recorded  Confirmed


 


Albuterol Sulfate [Albuterol 2 puffs INH Q6HR 14





Sulfate Hfa]   


 


Fluticasone/Salmeterol [Advair 1 puffs INH DAILY 14





500-50 Diskus]   


 


Hydroxyzine HCl 25 mg PO DAILY 14


 


oxyCODONE [Roxicodone] 5 mg PO DAILY 14


 


Fluticasone [Flonase] 1 sprays BRENNAN BID #1 bottle 05/03/15 07/11/17


 


Butalb/Acetaminophen/Caffeine 1 tab PO PRN PRN 17





[Pmtccj-Fwpxakig-Slch -40]   


 


LORazepam [Ativan] 0.5 mg PO Q8H PRN #7 tablet 17 


 


predniSONE [Prednisone] 40 mg PO DAILY #10 tablet 17 


 


Amoxicillin 500 mg PO Q8H #30 capsule 17 


 


Oxymetazoline HCl [Afrin] 2 spray NS BID PRN #1 bottle 17 


 


predniSONE [Deltasone] 60 mg PO DAILY 5 Days  tablet 17 


 


Albuterol Sulf [Ventolin Hfa 1 - 2 puffs INH Q4HR PRN #1 inhaler 17 





Inhaler]   


 


Azithromycin [Zithromax] 250 mg PO DAILY #4 tablet 17 


 


Benzonatate [Tessalon] 100 mg PO TID PRN #25 capsule 17 


 


dexAMETHasone [Decadron] 4 mg PO DAILY #5 tablet 17 


 


guaiFENesin/CODEINE [Robitussin AC] 10 ml PO Q6H PRN #240 ml 17 


 


Cyclobenzaprine [Flexeril] 10 mg PO TID PRN #20 tablet 18 


 


HYDROcod/ACETAM 5/325 [Norco 5/325] 1 - 2 ea PO Q6H PRN #15 tablet 18 


 


predniSONE [Prednisone] 40 mg PO DAILY #6 tablet 18 


 


Benzonatate [Tessalon Perle] 100 - 200 mg PO TID PRN #30 capsule 18 


 


Ketotifen Fumarate 2 drops OP QID #1 bottle 19 


 


Sulfacetamide 10% Ophth Drops 1 drops OPTH Q3H #1 bottle 19 





[Sulfamide 10% Ophth Drops]   


 


Hydrocodone/Acetaminophen 1 - 2 each PO Q6H PRN #14 tablet 19 





[Hydrocodon-Acetaminophen 5-325]   


 


predniSONE [Deltasone] 60 mg PO DAILY 5 Days #15 tablet 19 














- Allergies


Allergies/Adverse Reactions: 


                                    Allergies











Allergy/AdvReac Type Severity Reaction Status Date / Time


 


baclofen Allergy  Respiratory Verified 19 18:17


 


NSAIDS (Non-Steroidal Allergy  Edema Verified 19 18:17





Anti-Inflamma     


 


ipratropium AdvReac  Unknown Verified 19 18:17














- Social History


Does the pt smoke?: No


Smoking Status: Never smoker


Does the pt drink ETOH?: No


Does the pt have substance abuse?: No





- Immunizations


Immunizations are current?: No





- POLST


Patient has POLST: No





PD ED PE NORMAL





- Vitals


Vital signs reviewed: Yes





- General


General: Alert and oriented X 3, No acute distress





- Neck


Neck: Supple, no meningeal sign, No bony TTP





- Extremities


Extremities: Other (Right foot is flat without tenderness.  There is some 

lateral swelling but no bony tenderness.  No deformity.  No evidence of 

infection, warmth, redness.)





- Neuro


Neuro: Alert and oriented X 3, Normal speech





Results





- Vitals


Vitals: 





                               Vital Signs - 24 hr











  19





  18:11


 


Temperature 36.4 C L


 


Heart Rate 97


 


Respiratory 16





Rate 


 


Blood Pressure 141/105 H


 


O2 Saturation 95








                                     Oxygen











O2 Source                      Room air

















Departure





- Departure


Disposition: 01 Home, Self Care


Clinical Impression: 


 Right foot pain





Condition: Good


Record reviewed to determine appropriate education?: Yes


Prescriptions: 


Hydrocodone/Acetaminophen [Hydrocodon-Acetaminophen 5-325] 1 - 2 each PO Q6H PRN

#14 tablet


 PRN Reason: pain


predniSONE [Deltasone] 60 mg PO DAILY 5 Days #15 tablet


Comments: 


Return for new or worsening symptoms, especially fever, chills, redness, or 

intolerable pain.  Follow-up with your doctor next week.





Do not drink or drive while taking narcotic pain medication.


Note that many narcotic pain relievers also contain Tylenol/acetaminophen.  

Please ensure that your total dose of acetaminophen from all sources does not 

exceed 3 g (3000 mg) per day.


You may get constipated while on this medication.  Take a stool softener such as

 Colace twice a day while you are on it.  Also add an over-the-counter laxative 

such as senna or MiraLAX on any day that you do not have a bowel movement.


If you received a narcotic pain medication or sedative while in the emergency d

epartment, do not drive for the next 24 hours.





Your blood pressure was elevated today on check into the emergency department.  

This does not mean that you have hypertension, it is a common phenomenon to come

 to the emergency department and have elevated blood pressure.  I recommend that

 you see your primary care physician within the week to have it rechecked when 

you are feeling better.

## 2019-11-25 ENCOUNTER — HOSPITAL ENCOUNTER (OUTPATIENT)
Age: 44
End: 2019-11-25
Payer: COMMERCIAL

## 2019-11-25 DIAGNOSIS — M79.671: Primary | ICD-10-CM

## 2019-11-25 DIAGNOSIS — M20.11: ICD-10-CM

## 2019-11-25 DIAGNOSIS — M20.31: ICD-10-CM

## 2019-11-25 DIAGNOSIS — M19.071: ICD-10-CM

## 2019-11-25 DIAGNOSIS — M21.611: ICD-10-CM

## 2019-11-25 DIAGNOSIS — M25.551: ICD-10-CM

## 2019-11-25 PROCEDURE — 73610 X-RAY EXAM OF ANKLE: CPT

## 2019-11-25 PROCEDURE — 73630 X-RAY EXAM OF FOOT: CPT

## 2019-11-25 PROCEDURE — 73502 X-RAY EXAM HIP UNI 2-3 VIEWS: CPT

## 2019-11-25 NOTE — DI.RAD.S_ITS
PROCEDURE:  XR FOOT RT MIN 3V  
   
INDICATIONS:  RIGHT FOOT PAIN/ARTHRALGIA  
   
TECHNIQUE:  3 views of the foot were acquired.    
   
COMPARISON:  None.  
   
FINDINGS:    
   
Bones:  No fractures or dislocations.  No suspicious bony lesions.  Mild degenerative   
first MTP joint osteoarthritis is present, with metatarsus primus varus and hallux valgus   
morphology and bunion formation at the medial first metatarsal head  
   
Soft tissues:  No tibiotalar joint effusion.  Achilles tendon appears normal.    
   
IMPRESSION: Podiatry related findings as discussed, no acute trauma found.  
   
   
   
Dictated by: Jaylen Swan M.D. on 11/25/2019 at 12:19       
Approved by: Jaylen Swan M.D. on 11/25/2019 at 12:20

## 2019-11-25 NOTE — DI.RAD.S_ITS
PROCEDURE:  XR HIP W PEL IF DONE RT 2V  
   
INDICATIONS:  RIGHT FOOT PAIN/ARTHRALGIA  
   
TECHNIQUE: 2 views of the hip were acquired.    
   
COMPARISON:  None.  
   
FINDINGS:    
   
Bones:  No fractures or dislocations.  No suspicious bony lesions.  The visualized pelvic   
ring appears intact.    
   
Soft tissues:  No suspicious soft tissue calcifications or masses.    
   
IMPRESSION: A source of asymmetric right sided foot pain and hip arthralgias not seen.  
   
   
   
Dictated by: Jaylen Swan M.D. on 11/25/2019 at 12:19       
Approved by: Jaylen Swan M.D. on 11/25/2019 at 12:19

## 2019-11-25 NOTE — DI.RAD.S_ITS
PROCEDURE:  XR ANKLE RT MIN 3V  
   
INDICATIONS:  RIGHT FOOT PAIN/ARTHRALGIA  
   
TECHNIQUE: 3 views of the ankle were acquired.    
   
COMPARISON:  None.  
   
FINDINGS:    
   
Bones:  No fractures or dislocations.  Ankle mortise is normally aligned.  No suspicious   
bony lesions.    
   
Soft tissues:  No tibiotalar joint effusion.  Achilles tendon appears normal.    
   
IMPRESSION: No trauma found, no appreciable degenerative change.  
   
   
   
   
   
Dictated by: Jaylen Swan M.D. on 11/25/2019 at 12:20       
Approved by: Jaylen Swan M.D. on 11/25/2019 at 12:21

## 2019-11-30 ENCOUNTER — HOSPITAL ENCOUNTER (EMERGENCY)
Dept: HOSPITAL 76 - ED | Age: 44
Discharge: HOME | End: 2019-11-30
Payer: MEDICAID

## 2019-11-30 VITALS — DIASTOLIC BLOOD PRESSURE: 76 MMHG | SYSTOLIC BLOOD PRESSURE: 139 MMHG

## 2019-11-30 DIAGNOSIS — J45.21: ICD-10-CM

## 2019-11-30 DIAGNOSIS — M79.671: ICD-10-CM

## 2019-11-30 DIAGNOSIS — M62.831: Primary | ICD-10-CM

## 2019-11-30 DIAGNOSIS — J06.9: ICD-10-CM

## 2019-11-30 PROCEDURE — 99282 EMERGENCY DEPT VISIT SF MDM: CPT

## 2019-11-30 PROCEDURE — 99283 EMERGENCY DEPT VISIT LOW MDM: CPT

## 2019-11-30 NOTE — ED PHYSICIAN DOCUMENTATION
PD HPI LOWER EXT INJURY





- Stated complaint


Stated Complaint: SOA/BACK/FT PX





- Chief complaint


Chief Complaint: Ext Problem





- History obtained from


History obtained from: Patient





- History of Present Illness


PD HPI LOW EXT INJURY LOCATION: Right, Lower leg, Foot


Type of injury: Twist (had some mild twisting of right foot positioning for xray

and has some spasms since. h/o CP and has spasms at times. Also has had some URI

symptoms with exac of her asthma. She says steroids help both of these 

conditions in the past.).  No: Fall, Blunt / blow


Timing - onset: Yesterday


Timing - details: Gradual onset, Still present


Associated symptoms: No: Weakness, Numbness


Similar symptoms before: Diagnosis (spastic CP with spasms and inflammation at 

times.)





Review of Systems


Constitutional: denies: Fever, Chills


Nose: reports: Congestion.  denies: Rhinorrhea / runny nose


Throat: denies: Sore throat


Cardiac: denies: Chest pain / pressure


Respiratory: reports: Dyspnea, Cough, Wheezing


GI: denies: Nausea, Vomiting, Diarrhea





PD PAST MEDICAL HISTORY





- Past Medical History


Cardiovascular: None


Respiratory: Asthma


Neuro: None


Endocrine/Autoimmune: None


GI: None


GYN: None


: None


HEENT: None


Psych: Anxiety


Musculoskeletal: Osteoarthritis


Derm: None





- Past Surgical History


Past Surgical History: Yes


/GYN:  section





- Present Medications


Home Medications: 


                                Ambulatory Orders











 Medication  Instructions  Recorded  Confirmed


 


Albuterol Sulfate [Albuterol 2 puffs INH Q6HR 14





Sulfate Hfa]   


 


Fluticasone/Salmeterol [Advair 1 puffs INH DAILY 14





500-50 Diskus]   


 


Hydroxyzine HCl 25 mg PO DAILY 14


 


oxyCODONE [Roxicodone] 5 mg PO DAILY 14


 


Fluticasone [Flonase] 1 sprays BRENNAN BID #1 bottle 05/03/15 07/11/17


 


Butalb/Acetaminophen/Caffeine 1 tab PO PRN PRN 17





[[id number] -40]   


 


LORazepam [Ativan] 0.5 mg PO Q8H PRN #7 tablet 17 


 


predniSONE [Prednisone] 40 mg PO DAILY #10 tablet 17 


 


Amoxicillin 500 mg PO Q8H #30 capsule 17 


 


Oxymetazoline HCl [Afrin] 2 spray NS BID PRN #1 bottle 17 


 


predniSONE [Deltasone] 60 mg PO DAILY 5 Days  tablet 17 


 


Albuterol Sulf [Ventolin Hfa 1 - 2 puffs INH Q4HR PRN #1 inhaler 17 





Inhaler]   


 


Azithromycin [Zithromax] 250 mg PO DAILY #4 tablet 17 


 


Benzonatate [Tessalon] 100 mg PO TID PRN #25 capsule 17 


 


dexAMETHasone [Decadron] 4 mg PO DAILY #5 tablet 17 


 


guaiFENesin/CODEINE [Robitussin AC] 10 ml PO Q6H PRN #240 ml 17 


 


Cyclobenzaprine [Flexeril] 10 mg PO TID PRN #20 tablet 18 


 


HYDROcod/ACETAM 5/325 [Norco 5/325] 1 - 2 ea PO Q6H PRN #15 tablet 18 


 


predniSONE [Prednisone] 40 mg PO DAILY #6 tablet 18 


 


Benzonatate [Tessalon Perle] 100 - 200 mg PO TID PRN #30 capsule 18 


 


Ketotifen Fumarate 2 drops OP QID #1 bottle 19 


 


Sulfacetamide 10% Ophth Drops 1 drops OPTH Q3H #1 bottle 19 





[Sulfamide 10% Ophth Drops]   


 


Hydrocodone/Acetaminophen 1 - 2 each PO Q6H PRN #14 tablet 19 





[Hydrocodon-Acetaminophen 5-325]   


 


predniSONE [Deltasone] 60 mg PO DAILY 5 Days #15 tablet 19 


 


dexAMETHasone [Decadron] 4 mg PO DAILY #7 tablet 19 














- Allergies


Allergies/Adverse Reactions: 


                                    Allergies











Allergy/AdvReac Type Severity Reaction Status Date / Time


 


baclofen Allergy  Respiratory Verified 19 16:37


 


NSAIDS (Non-Steroidal Allergy  Edema Verified 19 16:37





Anti-Inflamma     


 


ipratropium AdvReac  Unknown Verified 19 16:37














- Social History


Does the pt smoke?: No


Smoking Status: Never smoker


Does the pt drink ETOH?: No


Does the pt have substance abuse?: No





- Immunizations


Immunizations are current?: No





- POLST


Patient has POLST: No





PD ED PE NORMAL





- Vitals


Vital signs reviewed: Yes





- General


General: Alert and oriented X 3, No acute distress, Well developed/nourished





- HEENT


HEENT: Moist mucous membranes, Pharynx benign





- Neck


Neck: Supple, no meningeal sign, No adenopathy





- Cardiac


Cardiac: RRR, No murmur





- Respiratory


Respiratory: No: Clear bilaterally (mild exp wheezes noted. No work of 

breathing. )





- Derm


Derm: Normal color, Warm and dry





- Extremities


Extremities: Other (feet with some lateral deviation and deformity c/w CP. no 

noted sores/redness/drainage. )





- Neuro


Neuro: Alert and oriented X 3, No motor deficit, No sensory deficit





Results





- Vitals


Vitals: 


                               Vital Signs - 24 hr











  19





  16:37


 


Temperature 36.6 C


 


Heart Rate 106 H


 


Respiratory 18





Rate 


 


Blood Pressure 139/76 H


 


O2 Saturation 97








                                     Oxygen











O2 Source                      Room air

















PD MEDICAL DECISION MAKING





- ED course


Complexity details: considered differential (she has muscle spasms of foot/leg 

and also some URI symptoms with asthma exac and says steroid helps with both. 

Just needs steroids. Has nebulizer and muscle relaxants. ), d/w patient





Departure





- Departure


Disposition: 01 Home, Self Care


Clinical Impression: 


 Muscle spasm of right lower extremity





Exacerbation of asthma


Qualifiers:


 Asthma severity: mild Asthma persistence: intermittent Qualified Code(s): J45.2

1 - Mild intermittent asthma with (acute) exacerbation





Condition: Stable


Record reviewed to determine appropriate education?: Yes


Follow-Up: 


Alfonso Kraft [Primary Care Provider] - 


Prescriptions: 


dexAMETHasone [Decadron] 4 mg PO DAILY #7 tablet


Comments: 


Decadron steroid daily for a week.  Hopefully this will help your foot and leg 

as well as your asthma.  Continue other usual medicines and nebulizers.


Discharge Date/Time: 19 17:28

## 2020-06-12 ENCOUNTER — HOSPITAL ENCOUNTER (OUTPATIENT)
Dept: HOSPITAL 76 - LAB | Age: 45
Discharge: HOME | End: 2020-06-12
Attending: PEDIATRICS
Payer: MEDICAID

## 2020-06-12 DIAGNOSIS — Z20.828: Primary | ICD-10-CM

## 2020-06-12 PROCEDURE — 81599 UNLISTED MAAA: CPT

## 2021-04-19 ENCOUNTER — HOSPITAL ENCOUNTER (EMERGENCY)
Dept: HOSPITAL 76 - ED | Age: 46
Discharge: HOME | End: 2021-04-19
Payer: MEDICAID

## 2021-04-19 VITALS — SYSTOLIC BLOOD PRESSURE: 142 MMHG | DIASTOLIC BLOOD PRESSURE: 79 MMHG

## 2021-04-19 DIAGNOSIS — T17.228A: ICD-10-CM

## 2021-04-19 DIAGNOSIS — R09.89: Primary | ICD-10-CM

## 2021-04-19 PROCEDURE — 99282 EMERGENCY DEPT VISIT SF MDM: CPT

## 2021-04-19 PROCEDURE — 99283 EMERGENCY DEPT VISIT LOW MDM: CPT

## 2021-04-19 NOTE — ED PHYSICIAN DOCUMENTATION
PD HPI HEENT





- Stated complaint


Stated Complaint: OBJECT IN THROAT





- Chief complaint


Chief Complaint: Heent





- History obtained from


History obtained from: Patient





- History of Present Illness


Timing - onset: Today


Timing - details: Abrupt onset


Location: Throat


Similar symptoms before: Has not had sx before


Recently seen: Not recently seen





- Additional information


Additional information: 





tonight while swallowing a piece of crusty bread, patient had sensation that the

bread/crust had become lodged in the back of her throat, specifically on the 

left side. She has tried gargling and probing with a q-tip but the sensation 

continues. She has not actually visualized a FB in her throat. She denies 

dyspnea, denies coughing.





Review of Systems


Throat: reports: Other (sensation of stuck FB left side of back of throat).  de

nies: Sore throat


Respiratory: denies: Dyspnea, Cough


GI: denies: Vomiting





PD PAST MEDICAL HISTORY





- Past Medical History


Cardiovascular: None


Respiratory: Asthma


Neuro: None


Endocrine/Autoimmune: None


GI: None


GYN: None


: None


HEENT: None


Psych: Anxiety


Musculoskeletal: Osteoarthritis


Derm: None





- Past Surgical History


Past Surgical History: Yes


/GYN:  section





- Present Medications


Home Medications: 


                                Ambulatory Orders











 Medication  Instructions  Recorded  Confirmed


 


Cetirizine HCl [Zyrtec] 10 mg PO DAILY 21


 


Clonidine HCl [Clonidine HCl ER] 0.1 mg PO DAILY 21


 


Dextroamphetamine/Amphetamine 25 mg PO DAILY 21





[Adderall Xr 25 mg Capsule]   


 


Fluticasone/Salmeterol [Advair PRN PRN 21 





500-50 Diskus]   


 


LORazepam [Ativan] 1 mg PO TID 21


 


LORazepam [Ativan] 1 mg PO TID 21


 


Methocarbamol [Robaxin-750] 750 mg PO TID 21


 


Oxycodone HCl 10 mg PO QID 21


 


Sertraline HCl 200 mg PO DAILY 21


 


cloNIDine [Catapres] 0.2 mg PO HS 21














- Allergies


Allergies/Adverse Reactions: 


                                    Allergies











Allergy/AdvReac Type Severity Reaction Status Date / Time


 


baclofen Allergy  Respiratory Verified 21 21:12


 


NSAIDS (Non-Steroidal Allergy  Edema Verified 21 21:12





Anti-Inflamma     


 


ipratropium AdvReac  Unknown Verified 21 21:12














- Social History


Does the pt smoke?: No


Smoking Status: Never smoker


Does the pt drink ETOH?: No


Does the pt have substance abuse?: No





- Immunizations


Immunizations are current?: No





- POLST


Patient has POLST: No





PD ED PE NORMAL





- Vitals


Vital signs reviewed: Yes





- General


General: Alert and oriented X 3, No acute distress, Well developed/nourished





- HEENT


HEENT: Moist mucous membranes, Pharynx benign





- Neck


Neck: Supple, no meningeal sign





Results





- Vitals


Vitals: 


                               Vital Signs - 24 hr











  21





  21:12 22:34


 


Temperature 36.5 C 36.7 C


 


Heart Rate 85 79


 


Respiratory 16 16





Rate  


 


Blood Pressure 159/82 H 142/79 H


 


O2 Saturation 98 99








                                     Oxygen











O2 Source                      Room air

















PD MEDICAL DECISION MAKING





- ED course


Complexity details: considered differential, d/w patient


ED course: 





I cannot visualized any FB. I had patient swallow 5 ml of viscous lidocaine to 

facilitate exam and I was able to visualize the entirety of the left tonsil; I 

also attempted fine suction of folds immediately below the left tonsil without 

retrieval of any FB. It is likely a swallowed piece of crusty bread caused an 

abrasion that is giving sensation of FB. If there is such a FB, being organic it

 will likely soften and dislodge on its own. I encouraged patient to return if 

worse, follow up with primary care provider if sensation persists





Departure





- Departure


Disposition: 01 Home, Self Care


Clinical Impression: 


 Foreign body in throat





Condition: Good


Instructions:  ED Foreign Body Swallowed Adult


Comments: 


As we discussed, I do not see a foreign body on exam. Possibilities include a 

foreign body that it out of my visual line of inspection as well as an abrasion 

left behind by a foreign body. Since the foreign body in question is a piece of 

bread/crust, it will likely soften and should become loose enough to dislodge 

(if it is still there). If you feel there is still a foreign body in 1-2 days, 

contact your primary care provider to arrange for reevaluation


Discharge Date/Time: 21 22:36

## 2022-11-08 ENCOUNTER — HOSPITAL ENCOUNTER (OUTPATIENT)
Age: 47
End: 2022-11-08
Payer: COMMERCIAL

## 2022-11-08 DIAGNOSIS — M43.17: Primary | ICD-10-CM

## 2022-11-08 DIAGNOSIS — M54.50: ICD-10-CM

## 2022-11-08 DIAGNOSIS — M25.552: ICD-10-CM

## 2022-11-08 PROCEDURE — 73502 X-RAY EXAM HIP UNI 2-3 VIEWS: CPT

## 2022-11-08 PROCEDURE — 72100 X-RAY EXAM L-S SPINE 2/3 VWS: CPT

## 2023-01-27 ENCOUNTER — HOSPITAL ENCOUNTER (EMERGENCY)
Age: 48
Discharge: ANOTHER HOSPITAL | End: 2023-01-27
Payer: COMMERCIAL

## 2023-01-27 VITALS
DIASTOLIC BLOOD PRESSURE: 62 MMHG | OXYGEN SATURATION: 99 % | SYSTOLIC BLOOD PRESSURE: 147 MMHG | TEMPERATURE: 97.5 F | HEART RATE: 70 BPM

## 2023-01-27 VITALS
HEART RATE: 68 BPM | SYSTOLIC BLOOD PRESSURE: 138 MMHG | DIASTOLIC BLOOD PRESSURE: 80 MMHG | RESPIRATION RATE: 16 BRPM | TEMPERATURE: 98.24 F | OXYGEN SATURATION: 100 %

## 2023-01-27 VITALS — BODY MASS INDEX: 31.2 KG/M2

## 2023-01-27 DIAGNOSIS — Z20.822: ICD-10-CM

## 2023-01-27 DIAGNOSIS — S01.111A: ICD-10-CM
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
## 2023-01-27 DIAGNOSIS — S01.411A: Primary | ICD-10-CM

## 2023-01-27 PROCEDURE — 70486 CT MAXILLOFACIAL W/O DYE: CPT

## 2023-01-27 PROCEDURE — 87635 SARS-COV-2 COVID-19 AMP PRB: CPT

## 2023-01-27 PROCEDURE — 36415 COLL VENOUS BLD VENIPUNCTURE: CPT

## 2023-01-27 PROCEDURE — 99284 EMERGENCY DEPT VISIT MOD MDM: CPT

## 2023-02-08 ENCOUNTER — HOSPITAL ENCOUNTER (OUTPATIENT)
Age: 48
End: 2023-02-08
Payer: COMMERCIAL

## 2023-02-08 DIAGNOSIS — I89.0: Primary | ICD-10-CM

## 2023-02-08 PROCEDURE — 93971 EXTREMITY STUDY: CPT

## 2023-08-14 ENCOUNTER — HOSPITAL ENCOUNTER (OUTPATIENT)
Age: 48
End: 2023-08-14
Payer: COMMERCIAL

## 2023-08-14 DIAGNOSIS — M79.602: ICD-10-CM

## 2023-08-14 DIAGNOSIS — M47.812: Primary | ICD-10-CM

## 2023-08-14 DIAGNOSIS — M54.9: ICD-10-CM

## 2023-08-14 DIAGNOSIS — M50.30: ICD-10-CM

## 2023-08-14 PROCEDURE — 73030 X-RAY EXAM OF SHOULDER: CPT

## 2023-08-14 PROCEDURE — 72050 X-RAY EXAM NECK SPINE 4/5VWS: CPT

## 2024-08-18 ENCOUNTER — HOSPITAL ENCOUNTER (EMERGENCY)
Dept: HOSPITAL 76 - ED | Age: 49
Discharge: HOME | End: 2024-08-18
Payer: MEDICAID

## 2024-08-18 VITALS — OXYGEN SATURATION: 98 % | DIASTOLIC BLOOD PRESSURE: 63 MMHG | SYSTOLIC BLOOD PRESSURE: 144 MMHG

## 2024-08-18 DIAGNOSIS — Z79.899: ICD-10-CM

## 2024-08-18 DIAGNOSIS — J45.41: Primary | ICD-10-CM

## 2024-08-18 DIAGNOSIS — M79.89: ICD-10-CM

## 2024-08-18 DIAGNOSIS — Z86.718: ICD-10-CM

## 2024-08-18 DIAGNOSIS — Z79.51: ICD-10-CM

## 2024-08-18 LAB
ANION GAP SERPL CALCULATED.4IONS-SCNC: 5 MMOL/L (ref 6–13)
BUN SERPL-MCNC: 17 MG/DL (ref 6–20)
CALCIUM UR-MCNC: 9.2 MG/DL (ref 8.5–10.3)
CHLORIDE SERPL-SCNC: 106 MMOL/L (ref 101–111)
CO2 SERPL-SCNC: 30 MMOL/L (ref 21–32)
CREAT SERPLBLD-SCNC: 0.8 MG/DL (ref 0.6–1.3)
GFRSERPLBLD MDRD-ARVRAT: 76 ML/MIN/{1.73_M2} (ref 89–?)
GLUCOSE SERPL-MCNC: 88 MG/DL (ref 74–104)
POTASSIUM SERPL-SCNC: 3.5 MMOL/L (ref 3.5–4.5)
SODIUM SERPLBLD-SCNC: 141 MMOL/L (ref 135–145)

## 2024-08-18 PROCEDURE — 99284 EMERGENCY DEPT VISIT MOD MDM: CPT

## 2024-08-18 PROCEDURE — 85379 FIBRIN DEGRADATION QUANT: CPT

## 2024-08-18 PROCEDURE — 80048 BASIC METABOLIC PNL TOTAL CA: CPT

## 2024-08-18 PROCEDURE — 36415 COLL VENOUS BLD VENIPUNCTURE: CPT

## 2024-08-18 PROCEDURE — 99283 EMERGENCY DEPT VISIT LOW MDM: CPT

## 2024-08-18 NOTE — ED PHYSICIAN DOCUMENTATION
History of Present Illness





- Stated complaint


Stated Complaint: SOA/L LEG PX





- Chief complaint


Chief Complaint: Ext Problem





- History obtained from


History obtained from: Patient





- Additonal information


Additional information: 





48-year-old with history of asthma and cerebral palsy.  She is here mostly with 

shortness of breath.  Stating that her Advair ran out and she is having trouble 

with her insurance.  She feels wheezy and has a dry cough with mild production 

of sputum.  She also notes some left leg swelling.  She does have remote history

of some sort of blood clot in her 20s.  Not currently anticoagulated.  No chest 

pain.





PD PAST MEDICAL HISTORY





- Past Medical History


Past Medical History: Yes


Cardiovascular: None


Respiratory: Asthma


Neuro: None


Endocrine/Autoimmune: None


GI: None


GYN: None


: None


HEENT: None


Psych: Anxiety


Musculoskeletal: Osteoarthritis


Derm: None





- Past Surgical History


Past Surgical History: Yes


/GYN:  section





- Present Medications


Home Medications: 


                                Ambulatory Orders











 Medication  Instructions  Recorded  Confirmed


 


Cetirizine HCl [Zyrtec] 10 mg PO DAILY 21


 


Dextroamphetamine/Amphetamine 25 mg PO DAILY 21





[Adderall Xr 25 mg Capsule]   


 


Fluticasone Propion/Salmeterol PRN PRN 21 





[Advair 500-50 Diskus]   


 


LORazepam [Ativan] 1 mg PO TID 21


 


LORazepam [Ativan] 1 mg PO TID 21


 


Oxycodone HCl 10 mg PO QID 21


 


Sertraline HCl 200 mg PO DAILY 21


 


cloNIDine HCL [Clonidine HCl ER] 0.1 mg PO DAILY 21


 


cloNIDine [Catapres] 0.2 mg PO HS 21


 


methocarbamoL [Robaxin-750] 750 mg PO TID 21


 


Compression Socks, Large 1 each MC ONCE #1 each 24 





[Lifestylecomfort Socks]   


 


predniSONE [Deltasone] 20 mg PO [id number] #21 tab 24 














- Allergies


Allergies/Adverse Reactions: 


                                    Allergies











Allergy/AdvReac Type Severity Reaction Status Date / Time


 


baclofen Allergy  Respiratory Verified 24 21:50


 


NSAIDS (Non-Steroidal Allergy  Edema Verified 24 21:50





Anti-Inflamma     


 


ipratropium AdvReac  Unknown Verified 24 21:50














- Social History


Does the pt smoke?: No


Smoking Status: Never smoker


Does the pt drink ETOH?: No


Does the pt have substance abuse?: No





- Immunizations


Immunizations are current?: No





- POLST


Patient has POLST: No





PD ED PE NORMAL





- Vitals


Vital signs reviewed: Yes





- General


General: Alert and oriented X 3, No acute distress





- Cardiac


Cardiac: RRR, No murmur





- Respiratory


Respiratory: Other (Very mild expiratory wheezes, no labored breathing.)





- Abdomen


Abdomen: Non tender





- Derm


Derm: Normal color, Warm and dry





- Extremities


Extremities: Other (Mild pedal edema of the calf and ankle area on the left 

without signs of infection.)





- Neuro


Neuro: Alert and oriented X 3, Normal speech





Results





- Vitals


Vitals: 


                               Vital Signs - 24 hr











  24





  21:50 22:15


 


Temperature 36.5 C 


 


Heart Rate 80 70


 


Respiratory 16 20





Rate  


 


Blood Pressure 144/59 H 144/63 H


 


O2 Saturation 96 98








                                     Oxygen











O2 Source                      Room air

















- Labs


Labs: 


                                Laboratory Tests











  24





  22:37 22:37


 


D-Dimer  227.0 


 


Sodium   141


 


Potassium   3.5


 


Chloride   106


 


Carbon Dioxide   30


 


Anion Gap   5.0 L


 


BUN   17


 


Creatinine   0.8


 


Estimated GFR (MDRD)   76 L


 


Glucose   88


 


Calcium   9.2














PD Medical Decision Making





- ED course


ED course: 





48-year-old woman with asthma exacerbation that is mild and wanting some 

steroids which is perfectly reasonable.  She also has left ankle swelling.  She 

presents with an hour where I do not have access to ultrasonography to rule out 

DVT.  We discussed options includin.  D-dimer and if negative discharge without anticoagulants and if positive, 

anticoagulation and return to ED tomorrow for ultrasound


2.  Presumptive anticoagulation with return to ED tomorrow for ultrasound


She opted for the first option.





Subsequently her D-dimer and Bmp were normal.





Departure





- Departure


Disposition: 01 Home, Self Care


Clinical Impression: 


 Left leg swelling





Asthma exacerbation


Qualifiers:


 Asthma severity: moderate Asthma persistence: persistent Qualified Code(s): 

J45.41 - Moderate persistent asthma with (acute) exacerbation





Condition: Good


Record reviewed to determine appropriate education?: Yes


Instructions:  Asthma Dc


Prescriptions: 


predniSONE [Deltasone] 20 mg PO [id number] #21 tab


Compression Socks, Large [Lifestylecomfort Socks] 1 each MC ONCE #1 each


Comments: 


I sent your prescription electronically to the [facility] in [place].





Your D-dimer was negative which presumptively rules out blood clots.  Call your 

doctor to arrange a follow-up appointment, make the next available appointment. 

In the interim, return anytime if worse or if new symptoms develop.


Forms:  PCP List, Activity restrictions

## 2025-03-15 ENCOUNTER — HOSPITAL ENCOUNTER (OUTPATIENT)
Age: 50
End: 2025-03-15
Payer: COMMERCIAL

## 2025-03-15 DIAGNOSIS — M25.562: Primary | ICD-10-CM

## 2025-03-15 PROCEDURE — 73562 X-RAY EXAM OF KNEE 3: CPT

## 2025-03-15 NOTE — DI.RAD.S_ITS
PROCEDURE:  XR KNEE LT 3V 
  
INDICATIONS:  LEFT KNEE PAIN 
  
TECHNIQUE:  3 views of the knee were acquired.   
  
COMPARISON:  None. 
  
FINDINGS:   
  
Bones:  No fractures or dislocations.  No suspicious bony lesions.   
  
Soft tissues:  No joint effusion.  No suspicious soft tissue calcifications.   
  
  
IMPRESSION: 
   
No visualized acute fracture or dislocation. However, if clinical concern and/or pain  
persist, short interval imaging followup in 7-10 days is recommended, as occult injury  
cannot be definitively excluded. 
  
  
Dictated by: Patricia Morales M.D. on 3/15/2025 at 15:58      
Approved by: Patricia Morales M.D. on 3/15/2025 at 16:06